# Patient Record
Sex: FEMALE | Race: WHITE | Employment: FULL TIME | URBAN - NONMETROPOLITAN AREA
[De-identification: names, ages, dates, MRNs, and addresses within clinical notes are randomized per-mention and may not be internally consistent; named-entity substitution may affect disease eponyms.]

---

## 2019-02-19 ENCOUNTER — OFFICE VISIT (OUTPATIENT)
Dept: FAMILY MEDICINE CLINIC | Age: 64
End: 2019-02-19

## 2019-02-19 VITALS
HEART RATE: 102 BPM | DIASTOLIC BLOOD PRESSURE: 84 MMHG | SYSTOLIC BLOOD PRESSURE: 134 MMHG | OXYGEN SATURATION: 97 % | BODY MASS INDEX: 46.63 KG/M2 | HEIGHT: 61 IN | TEMPERATURE: 98.2 F | WEIGHT: 247 LBS

## 2019-02-19 DIAGNOSIS — R21 RASH OF FACE: Primary | ICD-10-CM

## 2019-02-19 DIAGNOSIS — R21 ERYTHEMATOUS RASH: ICD-10-CM

## 2019-02-19 PROCEDURE — 99202 OFFICE O/P NEW SF 15 MIN: CPT | Performed by: NURSE PRACTITIONER

## 2019-02-19 RX ORDER — VITAMIN B COMPLEX
1 CAPSULE ORAL DAILY
COMMUNITY
End: 2019-03-11 | Stop reason: ALTCHOICE

## 2019-02-19 RX ORDER — METHYLPREDNISOLONE 4 MG
2 TABLET, DOSE PACK ORAL DAILY
COMMUNITY
End: 2019-03-11 | Stop reason: ALTCHOICE

## 2019-02-19 RX ORDER — BETAMETHASONE DIPROPIONATE 0.5 MG/G
CREAM TOPICAL
Qty: 45 G | Refills: 0 | Status: SHIPPED | OUTPATIENT
Start: 2019-02-19 | End: 2019-06-11

## 2019-02-19 RX ORDER — LORATADINE 10 MG/1
10 TABLET ORAL DAILY
COMMUNITY
End: 2019-03-11 | Stop reason: ALTCHOICE

## 2019-02-19 RX ORDER — CLOBETASOL PROPIONATE 0.05 G/100ML
SHAMPOO TOPICAL
Qty: 118 ML | Refills: 0 | Status: SHIPPED | OUTPATIENT
Start: 2019-02-19 | End: 2019-06-11 | Stop reason: ALTCHOICE

## 2019-02-19 RX ORDER — MILK THISTLE FRUIT EXTRACT 140 MG
CAPSULE ORAL
COMMUNITY
End: 2019-03-11 | Stop reason: ALTCHOICE

## 2019-02-19 ASSESSMENT — ENCOUNTER SYMPTOMS
COLOR CHANGE: 1
SHORTNESS OF BREATH: 0
CONSTIPATION: 0
DIARRHEA: 0
NAUSEA: 0
VOMITING: 0

## 2019-02-19 ASSESSMENT — PATIENT HEALTH QUESTIONNAIRE - PHQ9
SUM OF ALL RESPONSES TO PHQ9 QUESTIONS 1 & 2: 0
1. LITTLE INTEREST OR PLEASURE IN DOING THINGS: 0
SUM OF ALL RESPONSES TO PHQ QUESTIONS 1-9: 0
2. FEELING DOWN, DEPRESSED OR HOPELESS: 0
SUM OF ALL RESPONSES TO PHQ QUESTIONS 1-9: 0

## 2019-03-11 ENCOUNTER — OFFICE VISIT (OUTPATIENT)
Dept: FAMILY MEDICINE CLINIC | Age: 64
End: 2019-03-11

## 2019-03-11 VITALS
SYSTOLIC BLOOD PRESSURE: 140 MMHG | DIASTOLIC BLOOD PRESSURE: 66 MMHG | HEART RATE: 113 BPM | OXYGEN SATURATION: 94 % | BODY MASS INDEX: 45.5 KG/M2 | WEIGHT: 240.8 LBS

## 2019-03-11 DIAGNOSIS — L30.9 DERMATITIS: Primary | ICD-10-CM

## 2019-03-11 PROCEDURE — 99213 OFFICE O/P EST LOW 20 MIN: CPT | Performed by: FAMILY MEDICINE

## 2019-03-11 RX ORDER — PANTOTHENIC ACID (VIT B5) 500 MG
TABLET ORAL
COMMUNITY
End: 2019-03-11 | Stop reason: ALTCHOICE

## 2019-03-11 RX ORDER — HYDROXYZINE HYDROCHLORIDE 25 MG/1
25 TABLET, FILM COATED ORAL 2 TIMES DAILY
Qty: 30 TABLET | Refills: 0 | Status: SHIPPED | OUTPATIENT
Start: 2019-03-11 | End: 2019-04-10

## 2019-03-11 RX ORDER — PREDNISONE 10 MG/1
TABLET ORAL
Qty: 30 TABLET | Refills: 0 | Status: SHIPPED | OUTPATIENT
Start: 2019-03-11 | End: 2021-11-08 | Stop reason: CLARIF

## 2019-03-11 ASSESSMENT — ENCOUNTER SYMPTOMS
EYES NEGATIVE: 1
RESPIRATORY NEGATIVE: 1
GASTROINTESTINAL NEGATIVE: 1

## 2019-04-08 ENCOUNTER — TELEPHONE (OUTPATIENT)
Dept: FAMILY MEDICINE CLINIC | Age: 64
End: 2019-04-08

## 2019-04-08 DIAGNOSIS — Z12.11 ENCOUNTER FOR SCREENING COLONOSCOPY: Primary | ICD-10-CM

## 2019-04-08 NOTE — TELEPHONE ENCOUNTER
Patient says she saw dr Lul Perry for dermatitis. Dr Lul Perry recommended patient have a Mammogram and colonoscopy screening. Patient is asking if this something she should do soon or if she can wait until after mother's day.  Asking if she can do the fit test.

## 2019-05-20 ENCOUNTER — INITIAL CONSULT (OUTPATIENT)
Dept: GASTROENTEROLOGY | Age: 64
End: 2019-05-20
Payer: COMMERCIAL

## 2019-05-20 VITALS
WEIGHT: 228 LBS | HEIGHT: 61 IN | DIASTOLIC BLOOD PRESSURE: 78 MMHG | BODY MASS INDEX: 43.05 KG/M2 | SYSTOLIC BLOOD PRESSURE: 138 MMHG

## 2019-05-20 DIAGNOSIS — R79.89 ELEVATED LFTS: ICD-10-CM

## 2019-05-20 DIAGNOSIS — R13.19 ESOPHAGEAL DYSPHAGIA: ICD-10-CM

## 2019-05-20 DIAGNOSIS — Z12.11 SCREENING FOR COLON CANCER: Primary | ICD-10-CM

## 2019-05-20 PROCEDURE — 99204 OFFICE O/P NEW MOD 45 MIN: CPT | Performed by: INTERNAL MEDICINE

## 2019-05-20 RX ORDER — LORATADINE 10 MG/1
CAPSULE, LIQUID FILLED ORAL
COMMUNITY

## 2019-05-20 RX ORDER — POLYETHYLENE GLYCOL 3350 17 G/17G
238 POWDER ORAL DAILY
Qty: 255 G | Refills: 0 | Status: SHIPPED | OUTPATIENT
Start: 2019-05-20 | End: 2019-06-11

## 2019-05-20 NOTE — PATIENT INSTRUCTIONS
Zack Banner    [address] [facility] ,  [address] [facility]  Phone: [phone] [facility] [address], [address] [facility], [facility]  Phone: [phone]    Sedation  Three types of sedation are used for endoscopy and colonoscopy. The standard and most common is called conscious sedation. This is administered by the gastroenterologist and is part of the standard procedure. Common medications used for this are IV forms of a benzodiazepine (most commonly Versed) and a narcotic (most commonly fentanyl). Benadryl and nausea medicines may also be used. The effect of this is to make you comfortable. Most people will actually have amnesia with this and not recall the procedure. Some individuals will have other types of anesthesia provided by an anesthesiologist or nurse anesthetist.  The reason for this is a history of poor sedation, medication use that makes one more resistant to conscious sedation, a medical condition for which conscious sedation is contraindicated or other medical unstable conditions. This usually involves a separate fee from anesthesia. The most common of these is propofol (diprivan sedation) which is a deeper sedative the conscious sedation. In some instances, general anesthesia with intubation (breathing tube) is required. If you need to cancel or reschedule, please do so at least 2 weeks before the procedure, so that we can be considerate to other patients who are waiting to be scheduled.     If you cancel or reschedule less than 7 days before your procedure, you will be placed on a lower priority list.    ENDOSCOPY OVERVIEW  An upper endoscopy, often referred to as endoscopy, EGD, or uijxwqiu-jqnqsh-iqumcxkridec, is a procedure that allows a physician to directly examine the upper part of the gastrointestinal (GI) tract, which includes the esophagus (swallowing tube), the stomach, and the duodenum (the first section of the small intestine)  The physician who performs the procedures, known as an endoscopist, has special training in using an endoscope to examine the upper GI system, looking for inflammation (redness, irritation), bleeding, ulcers, or tumors. REASONS FOR UPPER ENDOSCOPY  The most common reasons for upper endoscopy include:  Unexplained discomfort in the upper abdomen   GERD or gastroesophageal reflux disease, (often called heartburn)   Persistent nausea and vomiting   Upper GI bleeding (vomiting blood or blood found in the stool that originated from the upper part of the gastrointestinal tract). Bleeding can be treated during the endoscopy. Difficulty swallowing; food/liquids getting stuck in the esophagus during swallowing. This may be caused by a narrowing (stricture) or tumor. The stricture may be dilated with special balloons or dilation tubes during the endoscopy. Abnormal or unclear findings on an upper GI x-ray, CT scan or MRI. Removal of a foreign body (a swallowed object). To check healing or progress on previously found polyps (growths), tumors, or ulcers. ENDOSCOPY PREPARATION  You will be given specific instructions regarding how to prepare for the examination before the procedure. These instructions are designed to maximize your safety during and after the examination and to minimize possible complications. It is important to read the instructions ahead of time and follow them carefully. Do not hesitate to call the physician's office or the endoscopy unit if there are questions. Nothing to eat after midnight the day before the test. You may be asked not to eat or drink anything for up to eight hours before the test. It is important for your stomach to be empty to allow the endoscopist to visualize the entire area and to decrease the possibility of food or fluid being vomited into the lungs while under sedation (called aspiration).   You may be asked to adjust the dose of your medications or to stop specific medications (such as aspirin-like drugs) temporarily before the examination. You should discuss your medications with your physician before your appointment for the endoscopy. You should arrange for a friend or family member to escort you home after the examination. Although you will be awake by the time you are discharged, the medications used for sedation cause temporary changes in the reflexes and judgment and interfere with your ability to drive or make decisions (similar to the effects of alcohol). WHAT TO EXPECT DURING ENDOSCOPY  Prior to the endoscopy, the staff will review your medical and surgical history, including current medications. A physician will explain the procedure and ask you to sign a consent. Before signing the consent, you should understand all the benefits and risks of the procedure, and should have all of your questions answered. An intravenous line (a needle inserted into a vein in the hand or arm) will be started to deliver medications. You will be given a combination of a sedative (to help you relax), and a narcotic (to prevent discomfort). Although most patients are sedated for the examination, many tolerate the procedure well without any medication. Your vital signs (blood pressure, heart rate, and blood oxygen level) will be monitored before, during, and after the examination. The monitoring is not painful. Oxygen is often given during the procedure through a small tube that sits under the nose and is fitted around the ears. For safety reasons, dentures should be removed before the procedure. THE ENDOSCOPY PROCEDURE  The procedure typically takes between 10 and 20 minutes to complete. The endoscopy is performed while you lie on your left side. Sometimes the physician will give a medication to numb the throat (either a gargle or a spray). A plastic mouth guard is placed between the teeth to prevent damage to the teeth and scope.   The endoscope (also called a gastroscope) is a flexible tube that is about the size of a finger. The scope has a lens and a light source that allows the endoscopist to look into the scope to see the inner lining of the upper gastrointestinal tract, or to view it on a TV monitor. Most people have no difficulty swallowing the flexible gastroscope as a result of the sedating medications. Many people sleep during the test; others are very relaxed and generally not aware of the examination. An alternative procedure called transnasal endoscopy may be available in some facilities. This involves passing a very thin scope (about the size of a drinking straw) through the nose. You are not sedated but a medication is applied to the nose to prevent discomfort. A full examination can be performed with this instrument. The endoscopist may take tissue samples called biopsies (not painful), or perform specific treatments (such as dilation, removal of polyps, treatment of bleeding), depending upon what is found during the examination. Air is introduced through the scope to open the esophagus, stomach, and intestine, allowing the scope to be passed through these structures and improving the endoscopist's ability to see all of the structures. You may experience a mild discomfort as air is pushed into the intestinal tract. This is not harmful and belching may relieve the sensation. The endoscope does not interfere with breathing. Taking slow, deep breaths during the procedure may help you to relax. ENDOSCOPY RECOVERY  After the endoscopy, you will be observed for one to two hours while the sedative medication wears off. The medicines cause most people to temporarily feel tired or have difficulty concentrating and you should not drive or return to work after the procedure. The most common discomfort after the examination is a feeling of bloating as a result of the air introduced during the examination. This usually resolves quickly.  Some patients also have a cleaned out so that the doctor can see any abnormal areas. To clean the colon, you will take a strong laxative and empty your bowels the night before your test.    Your doctor's office will provide specific instructions about how you should prepare for colonoscopy. Be sure to read these instructions ahead of time so you will be prepared for the prep. If you have questions, call the doctor's office in advance. You will need to avoid solid food for at least one day before the test. You should also drink plenty of fluids on the day before the test. You can drink clear liquids up to several hours before your procedure, including:        Water      Clear broth (beef, chicken, or vegetable)      Coffee or tea (without milk)      Ices      Gelatin (avoid red gelatin)    The day or night before the colonoscopy, you will take a laxative in two parts:        A pill that you take by mouth      A powder that is mixed with water    The most common laxative treatment is called Go-Lytely® or Half-Lytely®. You can add a flavoring (included), which, unfortunately, only partially hides the unpleasant taste. Most doctors do not recommend that you add other flavorings to the solution. Refrigerating the solution can make it easier to drink. Drinking this solution may be the most unpleasant part of the exam. You will begin to have watery diarrhea within a short time after drinking the solution. If you become nauseated or vomit while drinking the solution, call your doctor or nurse for instructions. Medicines - You can take most prescription and nonprescription medicines right up to the day of the colonoscopy. Your doctor should tell you what medicines to stop. You should also tell the doctor if you are allergic to any medicines. Some medicines increase the risk of heavy bleeding if you have a biopsy during the colonoscopy.  Ask your doctor how and when to stop these medicines, including warfarin/Coumadin® and clopidogrel/Plavix®. Transportation home - You will be given a sedative (a medicine to help you relax) during the colonoscopy, so you will need someone to take you home after your test. Although you will be awake by the time you go home, the sedative medicines cause changes in reflexes and judgment that can interfere with your ability to make decisions, similar to the effect of alcohol. WHAT TO EXPECT  Before the test, a doctor will review the test, including possible complications, and will ask you to sign a consent form. The nurse will start an IV line in your hand or arm. Your blood pressure and heart rate will be monitored during the test.    THE COLONOSCOPY PROCEDURE  You will be given fluid and medicines through an IV line. Many people sleep during the test, while others are very relaxed, comfortable, and generally not aware. The colonoscope is a flexible tube, approximately the size of the index finger. The scope pumps air into the colon to inflate it and allow the doctor to see the entire lining. You might feel bloating or gas cramps as the air opens the colon. Try not to be embarrassed about passing this gas, and let your doctor know if you are uncomfortable. During the procedure, the doctor might take a biopsy (small pieces of tissue) or remove polyps. Polyps are growths of tissue that can range in size from the tip of a pen to several inches. Most polyps are benign (not cancerous). However, some polyps can become cancerous if allowed to grow for a long time. Having a polyp removed does not hurt. RECOVERY FROM COLONOSCOPY  After the colonoscopy, you will be observed in a recovery area until the effects of the sedative medication wear off. The most common complaint after colonoscopy is a feeling of bloating and gas cramps. You may also feel groggy from the sedation medications. You should not return to work or drive that day.  Most people are able to eat normally after the test. Ask your WHERE TO GET MORE INFORMATION  Your healthcare provider is the best source of information for questions and concerns related to your medical problem. This article will be updated as needed every four months on our Web site ([email or web address]). The following organizations also provide reliable health information. [employer] (www.nlm.nih.gov/medlineplus/colonoscopy.html)  [address] for Gastrointestinal Endoscopy  (www.asge.org/PatientInfoIndex. aspx?rf=989)

## 2019-05-20 NOTE — LETTER
COLONOSCOPY PREP INSTRUCTIONS  MlRALAX SPLIT DOSE   [facility] PHYSICIAN ENDOSCOPY    Your colonoscopy is scheduled on: _6/3/19_   __Sarah/Ken_  Arrival Time: __10:00 am_   DO NOT EAT OR DRINK (INCLUDING WATER) AFTER: _6:00 am-after drinking the 2nd dose of prep  's Name_[facility] [phone]  [facility] [phone]    Keep these papers together; REVIEW ALL OF THEM AT LEAST 7 DAYS BEFORE THE PROCEDURE. Please complete all paperwork; including a current list of your medications, to avoid delays in the admission process. The following instructions must be followed in order to ensure your procedure has optimal outcomes. - KEEP YOUR APPOINTMENT. If for any reason, you are unable to keep your appointment, please notify us within 72 hours before your procedure. - You MUST have a responsible adult to drive you, who MUST remain at our facility the ENTIRE time. If not the procedure will be cancelled. You may go by taxi ONLY if you have a responsible adult with you. You may experience light headedness, dizziness etc., therefore you should have a responsible adult remain with you until the morning after your procedure. - Bring your insurance card and 's license. Call your insurance carrier to verify your benefits, and confirm that our facility is in your network, prior to the procedure date to ensure coverage. The facility name is listed as [facility] or [facility] [address]  and the tax ID# is [id number].  - Due to the safety and privacy of our patients, only one visitor is allowed in the recovery area after the procedure. The center will not be responsible for lost valuables so please leave them at home. - Try to avoid seeds (strawberries, reese, and rye) for one week prior to your procedure.   - If you have questions after beginning the prep, call between 8:30 am & have received instructions regarding if and when to discontinue the medication. If you have not, or do not clearly understand the instructions, please call the office for clarification (number listed above). 5. Drink plenty of fluid. 1 day prior to your procedure:  1. Do not eat any SOLID FOOD, beginning with breakfast drink clear liquids only, which includes: Chicken or Beef Broth, Coffee/tea (without milk or creamer) Gatorade/PowerAde (no red or purple), JeIl-O (no red or purple), All Soda (even dark cola), Sorbet/Popsicles (no red or purple), Water If you take Diabetic medications (insulin/oral medication)-reduce the amount by one half on the morning of prep. You may resume the meds once you begin eating again. You must drink 8oz of liquids every hour to avoid dehydration. 2. If you take Diabetic medications (insulin/oral medication) - reduce the amount by one half on morning of prep. You may resume the meds once you begin eating again. 3. Bowel Cleansing Prep  ** 3:00pm Take 4 dulcolax (bisacodyl) tablets with a full glass of water  ** 5:00pm Mix one bottle of Miralax (polyethlene glycol) (238/255gm) in one bottle of Gatorade (64oz). Shake until dissolved. Drink 8 oz every 15 minutes until you have finished half of the solution. MORNING OF PROCEDURE  1. __6:00 am__ (5 hours prior to the procedure) Drink the remaining portion of the solution 8 oz. every 15 minutes until completely finished, followed by 8 oz of any of the approved liquids. 2. Take your Blood pressure, Heart and Seizure medication the morning of the procedure with sips of water. 3. Bring inhalers with you. 4. Do not take your Diabetic medication the morning of procedure. 5. You must have a  stay with you during the entire procedure. Dear Latrice Whaley will receive a call from the [facility] pre-registration department prior to your GI procedure.  This will help streamline your check-in process on

## 2019-05-20 NOTE — LETTER
[address] ,  [address] [facility]  Phone: [phone] [facility] [address],  [address] [facility] Adele Castro  Phone: [phone]   YJL:[phone]    05/20/19    Patient:Argelia Esquivel  MR TSKTSS:[id number]  YOB: 1955  Date of Visit:5/20/19    Dear Dr. Alfredo Wheatley MD    Thank you for the request for consultation for Madhav Archibald to me for the evaluation of   Chief Complaint   Patient presents with   [address]     NP- dysphagia, needs screen colon   . Below are the relevant portions of my assessment and plan of care. FINAL DIAGNOSIS/Assessment   Diagnosis Orders   1. Screening for colon cancer  COLONOSCOPY W/ OR W/O BIOPSY    bisacodyl (DULCOLAX) 5 MG EC tablet    polyethylene glycol (MIRALAX) POWD powder   2. Esophageal dysphagia  EGD   3. Elevated LFTs  US Gallbladder Ruq       VISIT ORDERS/Plan  Orders Placed This Encounter   Procedures    COLONOSCOPY W/ OR W/O BIOPSY     Scheduling Instructions:      Please provide prep of choice instructions and prescription. General guidelines for holding blood thinners/anticoagulants around endoscopic procedure are but patients are encouraged to check with their prescribing physician. The patient may hold Plavix, Effient, Brilinta 5 days prior to the procedure unless:       A drug eluting stent has been placed within past 12 months. A nondrug eluting stent has been placed within past 1 month. Coumadin may be held 4 days prior to the procedure unless:        Mechanical mitral valve replacement (requires heparin bridge while Coumadin held and is managed by pharmacy)      Pradaxa, Xarelto, Eliquis may be held 2-3 days prior to procedure.   According to pharmacokinetics of the drug, package insert, cardiology practice patterns, and T1/2 of theses drugs (12 hrs), Eliquis and Xarelto prescribing physician. The patient may hold Plavix, Effient, Brilinta 5 days prior to the procedure unless:       A drug eluting stent has been placed within past 12 months. A nondrug eluting stent has been placed within past 1 month. Coumadin may be held 4 days prior to the procedure unless:        Mechanical mitral valve replacement (requires heparin bridge while Coumadin held and is managed by pharmacy)      Pradaxa, Xarelto, Eliquis may be held 2-3 days prior to procedure. According to pharmacokinetics of the drug, package insert, cardiology practice patterns, and T1/2 of theses drugs (12 hrs), Eliquis and Xarelto are held 48hrs prior to any procedure, including major surgical procedures w/o       increased bleeding.  That is usually the standard of care, as coagulation would/should be normalized at 48hrs. Every attempt should be made to maintain ASA 81mg per day throughout the vonnie-operative period in patients with diagnosis of ASHD. These recommendations may need to be modified by the provider/ based on risk /benefit analysis of the procedure and the patients history. If anticoagulation can not be held because recent cardiac stent, elective endoscopic procedures should be delayed until they have received the minimum duration of recommended antiplatlet therapy and it can safely be held. Again if unsure, patient should discuss with prescribing physician/service. If anticoagulation can not be stopped, endoscopic procedures can still be performed either diagnostically at a somewhat higher risk. Understand that any therapeutic procedure where anything beyond looking is performed, carries higher risks. For this reason without overt bleeding other testing       such as cologuard may be more appropriate.               High risk endoscopic procedures that require stopping antiplatelet and anticoagulation therapy include polypectomy, biliary or pancreatic sphincterotomy, pneumatic or bougie dilation, PEG placement, therapeutic balloon-assisted enteroscopy, EUS and FNA, tumor ablation by any technique,       cystogastrostomy,and treatment of varices. Order Specific Question:   Screening or Diagnostic? Answer:   Diagnostic       If you have questions, please do not hesitate to call me. I look forward to following Roma Nobles along with you.     Sincerely,        MANDY Verdin-Brittanyja 21 5/20/19 1:25 PM

## 2019-05-20 NOTE — PROGRESS NOTES
egd  [address] ,  [address] [facility], [facility]  Phone: [phone] COMPLAINT     Chief Complaint   Patient presents with   [address]     NP- dysphagia, needs screen colon       HPI     Thank you Jamey Niño MD for asking me to see Ritchie Liz in consultation. She is a Single [1] White [1] 58 y.o. Elaine Cr female seen independently who presents with the following GI complaints: Elaine Liz  Has been diagnosed with dermatomyositis with elevated lft's. CPK and transaminases are elevated. She was put on steroids and now has new solid food dysphagia without odynophagia. Has never had a colonoscopy. HPI elements: location, severity, timing, modifying factors, quality, duration, context and associated signs/symptoms. Last Encounter Reviewed:   Pertinent PMH, FH, SH is reviewed below. Last EGD: none  Last Colonoscopy: none    Review of available records reveals: Wt Readings from Last 50 Encounters:   19 228 lb (103.4 kg)   19 240 lb 12.8 oz (109.2 kg)   19 247 lb (112 kg)       No components found for: HGBA1C  BP Readings from Last 3 Encounters:   19 138/78   19 (!) 140/66   19 134/84     Health Maintenance   Topic Date Due    Hepatitis C screen  1955    HIV screen  1969    DTaP/Tdap/Td vaccine (1 - Tdap) 1973    Cervical cancer screen  1975    Lipid screen  1994    Diabetes screen  1994    Breast cancer screen  2004    Shingles Vaccine (1 of 2) 2004    Colon cancer screen colonoscopy  2004    Flu vaccine (Season Ended) 2019    Pneumococcal 0-64 years Vaccine  Aged Out       No components found for: [address]   No past medical history on file.   FAMILY HISTORY     Family History   Problem Relation Age of Onset    Other Mother         dieds in [de-identified] cancer;    Other Father          at age 80    Other Sister         breast cancer;    Other Brother         ??    Other Brother         one  at 32 of psych ;one at age 68-parkinsons     SOCIAL HISTORY     Social History     Socioeconomic History    Marital status: Single     Spouse name: Not on file    Number of children: Not on file    Years of education: Not on file    Highest education level: Not on file   Occupational History    Not on file   Social Needs    Financial resource strain: Not on file    Food insecurity:     Worry: Not on file     Inability: Not on file    Transportation needs:     Medical: Not on file     Non-medical: Not on file   Tobacco Use    Smoking status: Never Smoker    Smokeless tobacco: Never Used   Substance and Sexual Activity    Alcohol use: No    Drug use: No    Sexual activity: Not Currently   Lifestyle    Physical activity:     Days per week: Not on file     Minutes per session: Not on file    Stress: Not on file   Relationships    Social connections:     Talks on phone: Not on file     Gets together: Not on file     Attends Tenriism service: Not on file     Active member of club or organization: Not on file     Attends meetings of clubs or organizations: Not on file     Relationship status: Not on file    Intimate partner violence:     Fear of current or ex partner: Not on file     Emotionally abused: Not on file     Physically abused: Not on file     Forced sexual activity: Not on file   Other Topics Concern    Not on file   Social History Narrative    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;worked with flowers;     SURGICAL HISTORY     Past Surgical History:   Procedure Laterality Date    KNEE SURGERY Left     lateral release    SPINE SURGERY      lumbar 4 to 5     CURRENT MEDICATIONS   (This list may include medications prescribed during this encounter as epic can not insert only the list prior to this encounter.)  Current Outpatient Rx   Medication Sig Dispense Refill    loratadine (CLARITIN) 10 MG capsule Take by mouth      bisacodyl (DULCOLAX) 5 MG EC tablet Take 1 tablet by mouth daily as needed for Constipation 4 tablet 0    polyethylene glycol (MIRALAX) POWD powder Take 238 g by mouth daily Take as directed for colonoscopy 255 g 0    predniSONE (DELTASONE) 10 MG tablet 2bid for 5 days;then 2 qd for 5 days. 30 tablet 0    Boswellia Sara (BOSWELLIA PO) Take 1 capsule by mouth daily      Clobetasol Propionate 0.05 % SHAM Use 1-2 times daily while bathing. 118 mL 0    betamethasone dipropionate (DIPROLENE) 0.05 % cream Apply topically 2 times daily. 45 g 0     ALLERGIES     Allergies   Allergen Reactions    Asa [Aspirin] Rash    Penicillins Rash     IMMUNIZATIONS     There is no immunization history on file for this patient. REVIEW OF SYSTEMS   See HPI for further details and pertinent postiives. Negative for the following:  Constitutional: Negative for weight change. Negative for appetite change and fatigue. HENT: Negative for nosebleeds, sore throat, mouth sores, and voice change. Respiratory: Negative for cough, choking and chest tightness. Cardiovascular: Negative for chest pain   Gastrointestinal: See HPI  Musculoskeletal: Negative for arthralgias. Skin: Negative for pallor. Neurological: Negative for weakness and light-headedness. Hematological: Negative for adenopathy. Does not bruise/bleed easily. Psychiatric/Behavioral: Negative for suicidal ideas. PHYSICAL EXAM   VITAL SIGNS: /78 (Site: Right Upper Arm)   Ht 5' 1\" (1.549 m)   Wt 228 lb (103.4 kg)   BMI 43.08 kg/m²   Wt Readings from Last 3 Encounters:   05/20/19 228 lb (103.4 kg)   03/11/19 240 lb 12.8 oz (109.2 kg)   02/19/19 247 lb (112 kg)     Constitutional: Well developed, Well nourished, No acute distress, Non-toxic appearance. HENT: Normocephalic, Atraumatic, Bilateral external ears normal, Oropharynx moist, No oral exudates, Nose normal.   Eyes: Conjunctiva normal, No discharge.    Neck: Normal range of motion, No tenderness, Supple, No stridor. Lymphatic: No cervical, subclavian, or axillary lymphadenopathy. Cardiovascular: Normal heart rate, Normal rhythm, No murmurs, No rubs, No gallops. Thorax & Lungs: Normal breath sounds, No respiratory distress, No wheezing, No chest tenderness. No gynecomastia. Abdomen: scars consistent with stated surgeries, no hernias, no HSM, soft NTND   Rectal:  Deferred. Skin: Warm, Dry, No erythema, No rash. No bruising. No spider hemangiomas. Back: No tenderness, No CVA tenderness. Lower Extremities: Intact distal pulses, No edema, No tenderness, No cyanosis, No clubbing. Neurologic: Alert & oriented x 3, Normal motor function, Normal sensory function, No focal deficits noted. No asterixis. RADIOLOGY/PROCEDURES       FINAL IMPRESSION     Orders Placed This Encounter   Procedures    COLONOSCOPY W/ OR W/O BIOPSY     Scheduling Instructions:      Please provide prep of choice instructions and prescription. General guidelines for holding blood thinners/anticoagulants around endoscopic procedure are but patients are encouraged to check with their prescribing physician. The patient may hold Plavix, Effient, Brilinta 5 days prior to the procedure unless:       A drug eluting stent has been placed within past 12 months. A nondrug eluting stent has been placed within past 1 month. Coumadin may be held 4 days prior to the procedure unless:        Mechanical mitral valve replacement (requires heparin bridge while Coumadin held and is managed by pharmacy)      Pradaxa, Xarelto, Eliquis may be held 2-3 days prior to procedure.   According to pharmacokinetics of the drug, package insert, cardiology practice patterns, and T1/2 of theses drugs (12 hrs), Eliquis and Xarelto are held 48hrs prior to any procedure, including major surgical procedures w/o       increased bleeding.  That is usually the standard of care, as coagulation would/should be normalized at 48hrs. Every attempt should be made to maintain ASA 81mg per day throughout the vonnie-operative period in patients with diagnosis of ASHD. These recommendations may need to be modified by the provider/ based on risk /benefit analysis of the procedure and the patients history. If anticoagulation can not be held because recent cardiac stent, elective endoscopic procedures should be delayed until they have received the minimum duration of recommended antiplatlet therapy and it can safely be held. Again if unsure, patient should discuss with prescribing physician/service. If anticoagulation can not be stopped, endoscopic procedures can still be performed either diagnostically at a somewhat higher risk. Understand that any therapeutic procedure where anything beyond looking is performed, carries higher risks. For this reason without overt bleeding other testing       such as cologuard may be more appropriate. High risk endoscopic procedures that require stopping antiplatelet and anticoagulation therapy include polypectomy, biliary or pancreatic sphincterotomy, pneumatic or bougie dilation, PEG placement, therapeutic balloon-assisted enteroscopy, EUS and FNA, tumor ablation by any technique,       cystogastrostomy,and treatment of varices. Order Specific Question:   Screening or Diagnostic? Answer:   Diagnostic    US Gallbladder Ruq     Standing Status:   Future     Standing Expiration Date:   5/20/2020    EGD     Scheduling Instructions:      Please provide prep of choice instructions and prescription. General guidelines for holding blood thinners/anticoagulants around endoscopic procedure are but patients are encouraged to check with their prescribing physician. The patient may hold Plavix, Effient, Brilinta 5 days prior to the procedure unless:       A drug eluting stent has been placed within past 12 months.       A nondrug eluting stent has been placed within past 1 month. Coumadin may be held 4 days prior to the procedure unless:        Mechanical mitral valve replacement (requires heparin bridge while Coumadin held and is managed by pharmacy)      Pradaxa, Xarelto, Eliquis may be held 2-3 days prior to procedure. According to pharmacokinetics of the drug, package insert, cardiology practice patterns, and T1/2 of theses drugs (12 hrs), Eliquis and Xarelto are held 48hrs prior to any procedure, including major surgical procedures w/o       increased bleeding.  That is usually the standard of care, as coagulation would/should be normalized at 48hrs. Every attempt should be made to maintain ASA 81mg per day throughout the vonnie-operative period in patients with diagnosis of ASHD. These recommendations may need to be modified by the provider/ based on risk /benefit analysis of the procedure and the patients history. If anticoagulation can not be held because recent cardiac stent, elective endoscopic procedures should be delayed until they have received the minimum duration of recommended antiplatlet therapy and it can safely be held. Again if unsure, patient should discuss with prescribing physician/service. If anticoagulation can not be stopped, endoscopic procedures can still be performed either diagnostically at a somewhat higher risk. Understand that any therapeutic procedure where anything beyond looking is performed, carries higher risks. For this reason without overt bleeding other testing       such as cologuard may be more appropriate.               High risk endoscopic procedures that require stopping antiplatelet and anticoagulation therapy include polypectomy, biliary or pancreatic sphincterotomy, pneumatic or bougie dilation, PEG placement, therapeutic balloon-assisted enteroscopy, EUS and FNA, tumor ablation by any technique,       cystogastrostomy,and treatment of varices. Order Specific Question:   Screening or Diagnostic? Answer:   Jackelyn Martinez was seen today for establish care. Diagnoses and all orders for this visit:    Screening for colon cancer  -     COLONOSCOPY W/ OR W/O BIOPSY  -     bisacodyl (DULCOLAX) 5 MG EC tablet; Take 1 tablet by mouth daily as needed for Constipation  -     polyethylene glycol (MIRALAX) POWD powder; Take 238 g by mouth daily Take as directed for colonoscopy    Esophageal dysphagia  -     EGD    Elevated LFTs  -     US Gallbladder Ruq; Future      ORDERED FUTURE/PENDING TESTS       FOLLOWUP   Return for EGD & Colonoscopy.           Lila 40 5/20/19 1:14 PM    CC:  Betsy Hernandez MD

## 2019-05-28 ENCOUNTER — HOSPITAL ENCOUNTER (OUTPATIENT)
Dept: ULTRASOUND IMAGING | Age: 64
Discharge: HOME | End: 2019-05-28
Payer: COMMERCIAL

## 2019-05-28 DIAGNOSIS — R79.89 ELEVATED LFTS: ICD-10-CM

## 2019-05-28 PROCEDURE — 76705 ECHO EXAM OF ABDOMEN: CPT

## 2019-05-29 NOTE — RESULT ENCOUNTER NOTE
Please call patient with results. Large kidney cyst. If symptomatic would need to see urology but she did not indicate any symptoms of this at her visit. Rec repeat lft's every 3 months.

## 2019-05-31 ENCOUNTER — TELEPHONE (OUTPATIENT)
Dept: GASTROENTEROLOGY | Age: 64
End: 2019-05-31

## 2019-06-01 ENCOUNTER — TELEPHONE (OUTPATIENT)
Dept: GASTROENTEROLOGY | Age: 64
End: 2019-06-01

## 2019-06-11 ENCOUNTER — OFFICE VISIT (OUTPATIENT)
Dept: FAMILY MEDICINE CLINIC | Age: 64
End: 2019-06-11
Payer: COMMERCIAL

## 2019-06-11 ENCOUNTER — TELEPHONE (OUTPATIENT)
Dept: FAMILY MEDICINE CLINIC | Age: 64
End: 2019-06-11

## 2019-06-11 VITALS
BODY MASS INDEX: 39.49 KG/M2 | OXYGEN SATURATION: 99 % | WEIGHT: 209 LBS | SYSTOLIC BLOOD PRESSURE: 140 MMHG | HEART RATE: 114 BPM | DIASTOLIC BLOOD PRESSURE: 73 MMHG

## 2019-06-11 DIAGNOSIS — J69.0 ASPIRATION PNEUMONIA OF LEFT UPPER LOBE, UNSPECIFIED ASPIRATION PNEUMONIA TYPE (HCC): Primary | ICD-10-CM

## 2019-06-11 PROCEDURE — 99214 OFFICE O/P EST MOD 30 MIN: CPT | Performed by: FAMILY MEDICINE

## 2019-06-11 NOTE — PROGRESS NOTES
Post-Discharge Transitional Care Management Services      Magy Palmer   YOB: 1955    Date of Visit:  6/11/2019  30 Day Post-Discharge Date: ***    Allergies   Allergen Reactions    Asa [Aspirin] Rash    Penicillins Rash     Outpatient Medications Marked as Taking for the 6/11/19 encounter (Office Visit) with Rian Robles MD   Medication Sig Dispense Refill    loratadine (CLARITIN) 10 MG capsule Take by mouth      predniSONE (DELTASONE) 10 MG tablet 2bid for 5 days;then 2 qd for 5 days. 30 tablet 0         Vitals:    06/11/19 1449   BP: (!) 140/73   Pulse: 114   SpO2: 99%   Weight: 209 lb (94.8 kg)     Body mass index is 39.49 kg/m². Wt Readings from Last 3 Encounters:   06/11/19 209 lb (94.8 kg)   05/20/19 228 lb (103.4 kg)   03/11/19 240 lb 12.8 oz (109.2 kg)     BP Readings from Last 3 Encounters:   06/11/19 (!) 140/73   05/20/19 138/78   03/11/19 (!) 140/66        Patient was [facility] to [facility]:19898} from *** to *** for ***. Inpatient course: Discharge summary reviewed- see chart. Current status: ***    Review of Systems:  {ROS Comprehensive:96828::\"A comprehensive review of systems was negative except for what was noted in the HPI. \"}    Physical Exam:  {GENERAL PHYSICAL EXAM:19990}    Initial post-discharge communication occurred between {TCMPN1:88341} and {TCMPN2:41584} on ***- see documentation in chart: {TCMPN3:55412}. Assessment/Plan:  There are no diagnoses linked to this encounter.       Diagnostic test results reviewed: {TCMPN5:44331}    Patient risk of morbidity and mortality: {Desc; low/moderate/high:146940}    Medical Decision Making: {TCMPN4:33989}

## 2019-06-16 ASSESSMENT — ENCOUNTER SYMPTOMS: SHORTNESS OF BREATH: 1

## 2019-06-28 ENCOUNTER — TELEPHONE (OUTPATIENT)
Dept: GASTROENTEROLOGY | Age: 64
End: 2019-06-28

## 2019-07-09 ENCOUNTER — TELEPHONE (OUTPATIENT)
Dept: FAMILY MEDICINE CLINIC | Age: 64
End: 2019-07-09

## 2019-07-15 ENCOUNTER — HOSPITAL ENCOUNTER (OUTPATIENT)
Age: 64
Setting detail: OUTPATIENT SURGERY
Discharge: HOME | End: 2019-07-15
Attending: INTERNAL MEDICINE | Admitting: INTERNAL MEDICINE
Payer: COMMERCIAL

## 2019-07-15 ENCOUNTER — TELEPHONE (OUTPATIENT)
Dept: GASTROENTEROLOGY | Age: 64
End: 2019-07-15

## 2019-07-15 VITALS
RESPIRATION RATE: 16 BRPM | TEMPERATURE: 97.1 F | HEIGHT: 61 IN | DIASTOLIC BLOOD PRESSURE: 79 MMHG | WEIGHT: 205 LBS | OXYGEN SATURATION: 95 % | SYSTOLIC BLOOD PRESSURE: 135 MMHG | BODY MASS INDEX: 38.71 KG/M2 | HEART RATE: 91 BPM

## 2019-07-15 PROCEDURE — 7100000010 HC PHASE II RECOVERY - FIRST 15 MIN: Performed by: INTERNAL MEDICINE

## 2019-07-15 PROCEDURE — 99153 MOD SED SAME PHYS/QHP EA: CPT | Performed by: INTERNAL MEDICINE

## 2019-07-15 PROCEDURE — 3609027000 HC COLONOSCOPY: Performed by: INTERNAL MEDICINE

## 2019-07-15 PROCEDURE — 7100000011 HC PHASE II RECOVERY - ADDTL 15 MIN: Performed by: INTERNAL MEDICINE

## 2019-07-15 PROCEDURE — 45378 DIAGNOSTIC COLONOSCOPY: CPT | Performed by: INTERNAL MEDICINE

## 2019-07-15 PROCEDURE — 99152 MOD SED SAME PHYS/QHP 5/>YRS: CPT | Performed by: INTERNAL MEDICINE

## 2019-07-15 PROCEDURE — 43235 EGD DIAGNOSTIC BRUSH WASH: CPT | Performed by: INTERNAL MEDICINE

## 2019-07-15 PROCEDURE — 2709999900 HC NON-CHARGEABLE SUPPLY: Performed by: INTERNAL MEDICINE

## 2019-07-15 PROCEDURE — 43450 DILATE ESOPHAGUS 1/MULT PASS: CPT | Performed by: INTERNAL MEDICINE

## 2019-07-15 PROCEDURE — 6360000002 HC RX W HCPCS: Performed by: INTERNAL MEDICINE

## 2019-07-15 PROCEDURE — 3609015300 HC ESOPHAGEAL DILATION MALONEY: Performed by: INTERNAL MEDICINE

## 2019-07-15 PROCEDURE — 3609017100 HC EGD: Performed by: INTERNAL MEDICINE

## 2019-07-15 RX ORDER — OMEPRAZOLE 20 MG/1
20 CAPSULE, DELAYED RELEASE ORAL DAILY
Qty: 30 CAPSULE | Refills: 11 | Status: SHIPPED | OUTPATIENT
Start: 2019-07-15 | End: 2021-11-08 | Stop reason: CLARIF

## 2019-07-15 RX ORDER — MIDAZOLAM HYDROCHLORIDE 5 MG/ML
INJECTION INTRAMUSCULAR; INTRAVENOUS PRN
Status: DISCONTINUED | OUTPATIENT
Start: 2019-07-15 | End: 2019-07-15 | Stop reason: ALTCHOICE

## 2019-07-15 RX ORDER — FENTANYL CITRATE 50 UG/ML
INJECTION, SOLUTION INTRAMUSCULAR; INTRAVENOUS PRN
Status: DISCONTINUED | OUTPATIENT
Start: 2019-07-15 | End: 2019-07-15 | Stop reason: ALTCHOICE

## 2019-07-15 RX ORDER — SODIUM CHLORIDE, SODIUM LACTATE, POTASSIUM CHLORIDE, CALCIUM CHLORIDE 600; 310; 30; 20 MG/100ML; MG/100ML; MG/100ML; MG/100ML
INJECTION, SOLUTION INTRAVENOUS ONCE
Status: DISCONTINUED | OUTPATIENT
Start: 2019-07-15 | End: 2019-07-15 | Stop reason: HOSPADM

## 2019-07-15 ASSESSMENT — PAIN DESCRIPTION - DESCRIPTORS: DESCRIPTORS: ACHING

## 2019-07-15 ASSESSMENT — PAIN - FUNCTIONAL ASSESSMENT: PAIN_FUNCTIONAL_ASSESSMENT: 0-10

## 2019-07-15 NOTE — TELEPHONE ENCOUNTER
----- Message from Elma Carrera MD sent at 7/15/2019 11:18 AM EDT -----  Colon recall 10 years, screening.

## 2019-07-15 NOTE — H&P
[address] ,  [address] [facility]  Phone: [phone]     CHIEF COMPLAINT           Chief Complaint   Patient presents with    Establish Care       NP- dysphagia, needs screen colon         HPI      Thank you Byron Chang MD for asking me to see Dar Sutherland in consultation. She is a Single [1] White [1] 58 y.o. Jean Bills female seen independently who presents with the following GI complaints: Jean Sutherland  Has been diagnosed with dermatomyositis with elevated lft's. CPK and transaminases are elevated. She was put on steroids and now has new solid food dysphagia without odynophagia. Has never had a colonoscopy.     HPI elements: location, severity, timing, modifying factors, quality, duration, context and associated signs/symptoms.     Last Encounter Reviewed:   Pertinent PMH, FH, SH is reviewed below. Last EGD: none  Last Colonoscopy: none     Review of available records reveals: Wt Readings from Last 50 Encounters:   05/20/19 228 lb (103.4 kg)   03/11/19 240 lb 12.8 oz (109.2 kg)   02/19/19 247 lb (112 kg)         No components found for: HGBA1C      BP Readings from Last 3 Encounters:   05/20/19 138/78   03/11/19 (!) 140/66   02/19/19 134/84           Health Maintenance   Topic Date Due    Hepatitis C screen  1955    HIV screen  09/03/1969    DTaP/Tdap/Td vaccine (1 - Tdap) 09/03/1973    Cervical cancer screen  09/03/1975    Lipid screen  09/03/1994    Diabetes screen  09/03/1994    Breast cancer screen  09/03/2004    Shingles Vaccine (1 of 2) 09/03/2004    Colon cancer screen colonoscopy  09/03/2004    Flu vaccine (Season Ended) 09/01/2019    Pneumococcal 0-64 years Vaccine  Aged Out         No components found for: [facility]      PAST MEDICAL HISTORY   Past Medical History   No past medical history on file.      FAMILY HISTORY      Family History         Family History   Problem Relation Age of Onset    Other

## 2019-07-15 NOTE — OP NOTE
hemorrhoids, fissures or skin tags. The colonoscope was inserted into the rectum and advanced under direct vision to the cecum, which was identified by the ileocecal valve and appendiceal orifice. The right colon was examined twice as this increases polyp detection especially if other right colon polyps, older age, male, or hinojosa syndrome. When segments could not be distended with CO2 or air, it was filled/distended with water. The quality of the colonic preparation was good. A careful inspection was made as the colonoscope was withdrawn, including a retroflexed view of the rectum; findings and interventions are described below. Appropriate photodocumentation Was Obtained. If photos taken, they were ordered to be scanned into the medical record. Findings:   -normal stomach, and duodenum  -Small (< 3 cm) sliding hiatal hernia  -esophagitis, LA Classification B (multiple erosions/ulcers)  -Schatski's ring at the GE junction - The esophagus was dilated with a 54F Richards with mild resistance and no heme.  -normal colonic mucosa throughout  - PREP: miralax  - Overall difficulty: mild in degree  - Abdominal pressure: yes - sigmoid  - Change in position: no  - Anesthesia issues: no  - Medivator use: no    Specimens: Was Obtained    Complications:   None; patient tolerated the procedure well. Disposition:   PACU - hemodynamically stable. Withdrawal Time:  8 minutes    Impression:   -See post-procedure diagnoses. Recommendations:  -Acid suppression with a proton pump inhibitor.  -Monitor response to esophageal dilation. Follow up recommended if dilation did not help or it recurs. -Screening colon 10 years.         Lila 40 7/15/19 11:04 AM

## 2019-09-23 ENCOUNTER — OFFICE VISIT (OUTPATIENT)
Dept: FAMILY MEDICINE CLINIC | Age: 64
End: 2019-09-23
Payer: MEDICARE

## 2019-09-23 VITALS
HEART RATE: 103 BPM | DIASTOLIC BLOOD PRESSURE: 62 MMHG | BODY MASS INDEX: 43.16 KG/M2 | OXYGEN SATURATION: 96 % | WEIGHT: 228.4 LBS | SYSTOLIC BLOOD PRESSURE: 138 MMHG

## 2019-09-23 DIAGNOSIS — E66.01 MORBID OBESITY WITH BMI OF 40.0-44.9, ADULT (HCC): ICD-10-CM

## 2019-09-23 DIAGNOSIS — M17.12 PRIMARY OSTEOARTHRITIS OF LEFT KNEE: Primary | ICD-10-CM

## 2019-09-23 PROCEDURE — G8400 PT W/DXA NO RESULTS DOC: HCPCS | Performed by: FAMILY MEDICINE

## 2019-09-23 PROCEDURE — 1036F TOBACCO NON-USER: CPT | Performed by: FAMILY MEDICINE

## 2019-09-23 PROCEDURE — G8427 DOCREV CUR MEDS BY ELIG CLIN: HCPCS | Performed by: FAMILY MEDICINE

## 2019-09-23 PROCEDURE — 1090F PRES/ABSN URINE INCON ASSESS: CPT | Performed by: FAMILY MEDICINE

## 2019-09-23 PROCEDURE — G8417 CALC BMI ABV UP PARAM F/U: HCPCS | Performed by: FAMILY MEDICINE

## 2019-09-23 PROCEDURE — 1123F ACP DISCUSS/DSCN MKR DOCD: CPT | Performed by: FAMILY MEDICINE

## 2019-09-23 PROCEDURE — 99213 OFFICE O/P EST LOW 20 MIN: CPT | Performed by: FAMILY MEDICINE

## 2019-09-23 PROCEDURE — 3017F COLORECTAL CA SCREEN DOC REV: CPT | Performed by: FAMILY MEDICINE

## 2019-09-23 PROCEDURE — 4040F PNEUMOC VAC/ADMIN/RCVD: CPT | Performed by: FAMILY MEDICINE

## 2019-09-23 RX ORDER — MELATONIN: COMMUNITY

## 2019-09-23 RX ORDER — HYDROCODONE BITARTRATE AND ACETAMINOPHEN 5; 325 MG/1; MG/1
1 TABLET ORAL EVERY 8 HOURS PRN
Qty: 20 TABLET | Refills: 0 | Status: SHIPPED | OUTPATIENT
Start: 2019-09-23 | End: 2019-09-30

## 2019-09-23 ASSESSMENT — ENCOUNTER SYMPTOMS: BACK PAIN: 1

## 2019-09-23 NOTE — PROGRESS NOTES
complain of pain when she reaches to the right side. The pain is in her lower lumbar area and slightly to the left of midline. Left knee exam: Small effusion, no increased warmth. Range of motion somewhat limited flexion   Nursing note and vitals reviewed. /62 (Site: Right Upper Arm, Position: Sitting, Cuff Size: Large Adult)   Pulse 103   Wt 228 lb 6.4 oz (103.6 kg)   SpO2 96%   BMI 43.16 kg/m²     Assessment:      Francisca Nuñez was seen today for back pain and knee pain. Diagnoses and all orders for this visit:    Primary osteoarthritis of left knee  -     HYDROcodone-acetaminophen (NORCO) 5-325 MG per tablet; Take 1 tablet by mouth every 8 hours as needed for Pain for up to 7 days. Morbid obesity with BMI of 40.0-44.9, adult ([facility] [address]    Morbid obesity with BMI of 40.0-44.9, adult [facility])           Plan:        Patient Instructions   Call if problems    Please call if increased radiating pain in legs or bowel/bladder dysfunction. The prednisone may lead to a collapsed lumbar vertebra, but this examiner does not believe that is the case at this time.   Persistent symptoms-please let us know    You were offered a steroid injection into the left knee but deferred           Osman Zayas

## 2021-11-08 ENCOUNTER — OFFICE VISIT (OUTPATIENT)
Dept: FAMILY MEDICINE CLINIC | Age: 66
End: 2021-11-08
Payer: MEDICARE

## 2021-11-08 VITALS
DIASTOLIC BLOOD PRESSURE: 80 MMHG | HEART RATE: 86 BPM | BODY MASS INDEX: 47.51 KG/M2 | WEIGHT: 242 LBS | SYSTOLIC BLOOD PRESSURE: 142 MMHG | HEIGHT: 60 IN | OXYGEN SATURATION: 98 % | TEMPERATURE: 97.6 F

## 2021-11-08 DIAGNOSIS — Z13.220 LIPID SCREENING: ICD-10-CM

## 2021-11-08 DIAGNOSIS — M33.90 DERMATOMYOSITIS (HCC): Primary | ICD-10-CM

## 2021-11-08 DIAGNOSIS — E66.01 CLASS 3 SEVERE OBESITY DUE TO EXCESS CALORIES WITH SERIOUS COMORBIDITY AND BODY MASS INDEX (BMI) OF 45.0 TO 49.9 IN ADULT (HCC): ICD-10-CM

## 2021-11-08 PROCEDURE — 1123F ACP DISCUSS/DSCN MKR DOCD: CPT | Performed by: NURSE PRACTITIONER

## 2021-11-08 PROCEDURE — G8427 DOCREV CUR MEDS BY ELIG CLIN: HCPCS | Performed by: NURSE PRACTITIONER

## 2021-11-08 PROCEDURE — G8417 CALC BMI ABV UP PARAM F/U: HCPCS | Performed by: NURSE PRACTITIONER

## 2021-11-08 PROCEDURE — 1090F PRES/ABSN URINE INCON ASSESS: CPT | Performed by: NURSE PRACTITIONER

## 2021-11-08 PROCEDURE — 3017F COLORECTAL CA SCREEN DOC REV: CPT | Performed by: NURSE PRACTITIONER

## 2021-11-08 PROCEDURE — G8400 PT W/DXA NO RESULTS DOC: HCPCS | Performed by: NURSE PRACTITIONER

## 2021-11-08 PROCEDURE — 99203 OFFICE O/P NEW LOW 30 MIN: CPT | Performed by: NURSE PRACTITIONER

## 2021-11-08 PROCEDURE — 1036F TOBACCO NON-USER: CPT | Performed by: NURSE PRACTITIONER

## 2021-11-08 PROCEDURE — G8484 FLU IMMUNIZE NO ADMIN: HCPCS | Performed by: NURSE PRACTITIONER

## 2021-11-08 PROCEDURE — 4040F PNEUMOC VAC/ADMIN/RCVD: CPT | Performed by: NURSE PRACTITIONER

## 2021-11-08 RX ORDER — ASCORBIC ACID 1000 MG
1 TABLET, EXTENDED RELEASE ORAL DAILY
COMMUNITY

## 2021-11-08 RX ORDER — THIAMINE MONONITRATE (VIT B1) 100 MG
100 TABLET ORAL DAILY
COMMUNITY

## 2021-11-08 RX ORDER — PREDNISONE 1 MG/1
5 TABLET ORAL DAILY
COMMUNITY

## 2021-11-08 RX ORDER — CHLORAL HYDRATE 500 MG
1 CAPSULE ORAL DAILY
COMMUNITY

## 2021-11-08 RX ORDER — PETROLATUM,WHITE/LANOLIN
OINTMENT (GRAM) TOPICAL
COMMUNITY

## 2021-11-08 ASSESSMENT — PATIENT HEALTH QUESTIONNAIRE - PHQ9
1. LITTLE INTEREST OR PLEASURE IN DOING THINGS: 0
SUM OF ALL RESPONSES TO PHQ QUESTIONS 1-9: 0
SUM OF ALL RESPONSES TO PHQ9 QUESTIONS 1 & 2: 0
2. FEELING DOWN, DEPRESSED OR HOPELESS: 0

## 2021-11-08 ASSESSMENT — ENCOUNTER SYMPTOMS
RHINORRHEA: 0
CONSTIPATION: 0
FACIAL SWELLING: 0
EYE REDNESS: 0
EYE PAIN: 0
NAUSEA: 0
BLOOD IN STOOL: 0
WHEEZING: 0
EYE DISCHARGE: 0
VOICE CHANGE: 0
APNEA: 0
CHOKING: 0
DIARRHEA: 0
COLOR CHANGE: 0
TROUBLE SWALLOWING: 0
CHEST TIGHTNESS: 0
ABDOMINAL DISTENTION: 0
SORE THROAT: 0
SHORTNESS OF BREATH: 0
PHOTOPHOBIA: 0
SINUS PAIN: 0
RECTAL PAIN: 0
ABDOMINAL PAIN: 0
STRIDOR: 0
VOMITING: 0
COUGH: 0
EYE ITCHING: 0
ANAL BLEEDING: 0
SINUS PRESSURE: 0

## 2021-11-08 NOTE — PROGRESS NOTES
11/8/2021    Chief Complaint   Patient presents with    Establish Care    Dermatitis     dermatomyotitis she sees Dr Kera Salgado for this        Chandni Sousa is a 78 y.o. female, presents today for:      ASSESSMENT/PLAN:    1. Dermatomyositis ([address]  Currently in flare, continue Prednisone taper  Continue care with Dr. Kera Salgado, appreciate assistance  Continue Prednisone/ Methotrexate for maintenance  Encouraged vaccinations- COVID, Influenza, PNA. Pt will wait until after finishing Prednisone taper before received vaccinations. Understands to hold MTX 1 week after received vaccinations  Discussed trial of Betadine or Vinegar to help reduce bilateral paronychia while working. 2. Class 3 severe obesity due to excess calories with serious comorbidity and body mass index (BMI) of 45.0 to 49.9 in [facility])  Encouraged exercise, decreased portions    3. Lipid screening  Labs ordered today. - Lipid, Fasting; Future      Return in about 9 months (around 8/8/2022). Presenting today to establish with care. Prior provider retired. Dermatomysitis: Currently following with Dr. Kera Salgado at [facility]. Currently on Methotrexate and Prednisone. Recent flare- Prednisone recently increased. Continues to have red hand blaterally for the past 6 months resolving with Prednisone. Has gained weight- 14 lbs- since 2019. Is having increased hunger with Prednisone. Just \"wants to get back to normal\" which she knows is likely unrealistic. Currently working as .  All edges of fingernails are very sensitivie- occurred since dx dermatomysitis. Washing daily with antibacterial soap. No improvement in pain with Aspercreme/ Lidocaine. Scared to get COVID vaccines regarding 2nd vaccine. Consider Influenza- PNA vaccination. Declining Mammogram/ DEXA scan.      No results found for: CHOL  No results found for: TRIG  No results found for: HDL  No results found for: LDLCHOLESTEROL, LDLCALC  No results found for: LABVLDL, VLDL  No results found for: CHOLHDLRATIO    No results found for: NA, K, CL, CO2, BUN, CREATININE, GLUCOSE, CALCIUM, PROT, LABALBU, BILITOT, ALKPHOS, AST, ALT, LABGLOM, GFRAA, AGRATIO, GLOB      Review of Systems   Constitutional: Positive for appetite change (increased due to prednisone) and fatigue (improving since prednisone increase). Negative for activity change, chills, diaphoresis and fever. HENT: Negative for congestion, dental problem, drooling, ear discharge, ear pain, facial swelling, hearing loss, mouth sores, nosebleeds, postnasal drip, rhinorrhea, sinus pressure, sinus pain, sneezing, sore throat, tinnitus, trouble swallowing and voice change. Eyes: Positive for visual disturbance (wears glasses). Negative for photophobia, pain, discharge, redness and itching. Respiratory: Negative for apnea, cough, choking, chest tightness, shortness of breath, wheezing and stridor. Cardiovascular: Positive for leg swelling (due to prednisone). Negative for chest pain and palpitations. Gastrointestinal: Negative for abdominal distention, abdominal pain, anal bleeding, blood in stool, constipation, diarrhea, nausea, rectal pain and vomiting. Skin: Positive for rash. Negative for color change, pallor and wound. Neurological: Negative for dizziness, tremors, seizures, syncope, facial asymmetry, speech difficulty, weakness, light-headedness, numbness and headaches. Hematological: Negative for adenopathy. Does not bruise/bleed easily. Psychiatric/Behavioral: Negative for agitation, behavioral problems, confusion, decreased concentration, dysphoric mood, hallucinations, self-injury, sleep disturbance and suicidal ideas. The patient is not nervous/anxious and is not hyperactive.         Current Outpatient Medications on File Prior to Visit   Medication Sig Dispense Refill    predniSONE (DELTASONE) 5 MG tablet Take 5 mg by mouth daily      Omega-3 1000 MG CAPS Take 1 capsule by mouth daily  VITAMIN A PO Take 1 tablet by mouth daily 10,000 IU      vitamin E 90 MG (200 UNIT) CAPS capsule Take 200 Units by mouth daily      Ascorbic Acid (VITAMIN C CR) 1000 MG TBCR Take 1 tablet by mouth daily      vitamin B-1 (THIAMINE) 100 MG tablet Take 100 mg by mouth daily      Boswellia Sara (BOSWELLIA PO) Take by mouth      Glucosamine Sulfate 1000 MG CAPS Take by mouth      Cholecalciferol (VITAMIN D3) 1000 units TABS Take by mouth      loratadine (CLARITIN) 10 MG capsule Take by mouth       No current facility-administered medications on file prior to visit.      Allergies   Allergen Reactions    Asa [Aspirin] Rash    Penicillins Rash     Past Medical History:   Diagnosis Date    Arthritis     bilat knees    Dermatomyositis ([facility] [address]      Past Surgical History:   Procedure Laterality Date    COLONOSCOPY N/A 7/15/2019    COLON performed by Candice oRllins MD at [address]  7/15/2019    ESOPHAGEAL Beatriz Fray performed by Candice Rollins MD at [facility] Left     lateral release    SPINE SURGERY      lumbar 4 to 5    UPPER GASTROINTESTINAL ENDOSCOPY N/A 7/15/2019    EGD (10:30) performed by Candice Rollins MD at [facility]. History     Tobacco Use    Smoking status: Never Smoker    Smokeless tobacco: Never Used   Substance Use Topics    Alcohol use: No     Family History   Problem Relation Age of Onset    Other Mother         dieds in [de-identified] cancer;   Gopi Keller Other Father          at age 80    Other Sister         breast cancer;    Other Brother         ??    Other Brother         one  at 32 of psych ;one at age 68-parkinsons       Vitals:    21 1009 21 1012   BP: (!) 158/90 (!) 142/80   Pulse: 86    Temp: 97.6 °F (36.4 °C)    TempSrc: Infrared    SpO2: 98%    Weight: 242 lb (109.8 kg)    Height: 5' (1.524 m)      Estimated body mass index is 47.26 kg/m² as calculated from the following:    Height as of this encounter: 5' (1.524 m). Weight as of this encounter: 242 lb (109.8 kg). Physical Exam  Vitals and nursing note reviewed. Constitutional:       Appearance: Normal appearance. She is obese. HENT:      Head: Normocephalic. Neck:      Vascular: No carotid bruit. Cardiovascular:      Rate and Rhythm: Normal rate and regular rhythm. Pulses: Normal pulses. Carotid pulses are 2+ on the right side and 2+ on the left side. Dorsalis pedis pulses are 2+ on the right side and 2+ on the left side. Posterior tibial pulses are 2+ on the right side and 2+ on the left side. Heart sounds: Normal heart sounds. No murmur heard. No gallop. Pulmonary:      Effort: Pulmonary effort is normal.      Breath sounds: Normal breath sounds. Abdominal:      General: Abdomen is flat. Palpations: Abdomen is soft. Musculoskeletal:         General: Normal range of motion. Hands:       Cervical back: Normal range of motion. Right lower leg: No edema. Left lower leg: No edema. Comments: Finger erythema   Lymphadenopathy:      Cervical: No cervical adenopathy. Skin:     General: Skin is warm and dry. Capillary Refill: Capillary refill takes less than 2 seconds. Comments: Macular erythema on central abdomen   Neurological:      General: No focal deficit present. Mental Status: She is alert and oriented to person, place, and time. Psychiatric:         Mood and Affect: Mood normal.         Behavior: Behavior normal.           Patient's questions answered and concerns addressed. Patient agrees to plan of care.         Electronically signed by DAX Espinoza CNP on 11/8/2021 at 2:16 PM

## 2021-12-13 ENCOUNTER — IMMUNIZATION (OUTPATIENT)
Dept: FAMILY MEDICINE CLINIC | Age: 66
End: 2021-12-13
Payer: MEDICARE

## 2021-12-13 DIAGNOSIS — Z23 NEED FOR INFLUENZA VACCINATION: Primary | ICD-10-CM

## 2021-12-13 DIAGNOSIS — Z23 NEED FOR PNEUMOCOCCAL VACCINATION: ICD-10-CM

## 2021-12-13 PROCEDURE — G0009 ADMIN PNEUMOCOCCAL VACCINE: HCPCS

## 2021-12-13 PROCEDURE — 90694 VACC AIIV4 NO PRSRV 0.5ML IM: CPT

## 2021-12-13 PROCEDURE — 90732 PPSV23 VACC 2 YRS+ SUBQ/IM: CPT

## 2021-12-13 PROCEDURE — G0008 ADMIN INFLUENZA VIRUS VAC: HCPCS

## 2021-12-13 NOTE — PROGRESS NOTES
Vaccine Information Sheet, \"Influenza - Inactivated\"  given to Ed Hug, or parent/legal guardian of  Ed Hug and verbalized understanding. Patient responses:    Have you ever had a reaction to a flu vaccine? No  Do you have any current illness? No  Have you ever had Guillian [place] Syndrome? No  Do you have a serious allergy to any of the follow: Neomycin, Polymyxin, Thimerosal, eggs or egg products? No    Flu vaccine given per order. Please see immunization tab. Risks and benefits explained. Current VIS given.       Immunizations Administered     Name Date Dose Route    Influenza, Quadv, adjuvanted, 65 yrs +, IM, PF (Fluad) 12/13/2021 0.5 mL Intramuscular    Site: Deltoid- Left    Lot: [id number]    NDC: 72431-311-69    Pneumococcal Polysaccharide ([id number]) 12/13/2021 0.5 mL Intramuscular    Site: Deltoid- Right    Lot: [id number]    NDC: 0243-9774-13

## 2022-03-30 ENCOUNTER — TELEPHONE (OUTPATIENT)
Dept: FAMILY MEDICINE CLINIC | Age: 67
End: 2022-03-30

## 2022-03-30 DIAGNOSIS — G47.33 OBSTRUCTIVE SLEEP APNEA: Primary | ICD-10-CM

## 2022-03-30 NOTE — TELEPHONE ENCOUNTER
----- Message from Jayesh Khalil sent at 3/30/2022 10:15 AM EDT -----  Subject: Message to Provider    QUESTIONS  Information for Provider? Patient called to request an order for a Sleep   Study that is needed by Dr Lily Asher. Please contact patient to   discuss options regarding this matter. ---------------------------------------------------------------------------  --------------  Davidson SPIVEY  What is the best way for the office to contact you? OK to leave message on   voicemail  Preferred Call Back Phone Number? [phone]  ---------------------------------------------------------------------------  --------------  SCRIPT ANSWERS  Relationship to Patient?  Self

## 2022-03-30 NOTE — TELEPHONE ENCOUNTER
----- Message from Judah Denny sent at 3/30/2022 10:15 AM EDT -----  Subject: Message to Provider    QUESTIONS  Information for Provider? Patient called to request an order for a Sleep   Study that is needed by Dr Carlos Aguayo. Please contact patient to   discuss options regarding this matter. ---------------------------------------------------------------------------  --------------  Shira SPIVEY  What is the best way for the office to contact you? OK to leave message on   voicemail  Preferred Call Back Phone Number? [phone]  ---------------------------------------------------------------------------  --------------  SCRIPT ANSWERS  Relationship to Patient?  Self

## 2022-03-30 NOTE — TELEPHONE ENCOUNTER
Patient states that Dr. Joe Barron wanted you to order sleep study. Mentioned something about having an option of having a device she can wear at home to complete this. Please advise.

## 2022-03-30 NOTE — TELEPHONE ENCOUNTER
Called Pt she said Dr Macho La is recommending a sleep study   Due to some results that came back with her blood work

## 2022-06-01 ENCOUNTER — OFFICE VISIT (OUTPATIENT)
Dept: PULMONOLOGY | Age: 67
End: 2022-06-01
Payer: MEDICARE

## 2022-06-01 VITALS
BODY MASS INDEX: 47.32 KG/M2 | OXYGEN SATURATION: 98 % | SYSTOLIC BLOOD PRESSURE: 147 MMHG | DIASTOLIC BLOOD PRESSURE: 80 MMHG | WEIGHT: 241 LBS | HEIGHT: 60 IN | HEART RATE: 97 BPM

## 2022-06-01 DIAGNOSIS — G47.19 EXCESSIVE DAYTIME SLEEPINESS: Primary | ICD-10-CM

## 2022-06-01 DIAGNOSIS — R06.83 SNORING: ICD-10-CM

## 2022-06-01 PROCEDURE — 1090F PRES/ABSN URINE INCON ASSESS: CPT

## 2022-06-01 PROCEDURE — 3017F COLORECTAL CA SCREEN DOC REV: CPT

## 2022-06-01 PROCEDURE — G8427 DOCREV CUR MEDS BY ELIG CLIN: HCPCS

## 2022-06-01 PROCEDURE — 1036F TOBACCO NON-USER: CPT

## 2022-06-01 PROCEDURE — 1123F ACP DISCUSS/DSCN MKR DOCD: CPT

## 2022-06-01 PROCEDURE — G8400 PT W/DXA NO RESULTS DOC: HCPCS

## 2022-06-01 PROCEDURE — G8417 CALC BMI ABV UP PARAM F/U: HCPCS

## 2022-06-01 PROCEDURE — 99204 OFFICE O/P NEW MOD 45 MIN: CPT

## 2022-06-01 RX ORDER — METHOTREXATE 2.5 MG/1
2.5 TABLET ORAL WEEKLY
COMMUNITY
Start: 2022-03-28

## 2022-06-01 ASSESSMENT — SLEEP AND FATIGUE QUESTIONNAIRES
HOW LIKELY ARE YOU TO NOD OFF OR FALL ASLEEP WHILE WATCHING TV: 2
HOW LIKELY ARE YOU TO NOD OFF OR FALL ASLEEP WHILE LYING DOWN TO REST IN THE AFTERNOON WHEN CIRCUMSTANCES PERMIT: 3
HOW LIKELY ARE YOU TO NOD OFF OR FALL ASLEEP WHILE SITTING AND TALKING TO SOMEONE: 0
HOW LIKELY ARE YOU TO NOD OFF OR FALL ASLEEP IN A CAR, WHILE STOPPED FOR A FEW MINUTES IN TRAFFIC: 0
HOW LIKELY ARE YOU TO NOD OFF OR FALL ASLEEP WHILE SITTING INACTIVE IN A PUBLIC PLACE: 0
NECK CIRCUMFERENCE (INCHES): 15
HOW LIKELY ARE YOU TO NOD OFF OR FALL ASLEEP WHEN YOU ARE A PASSENGER IN A CAR FOR AN HOUR WITHOUT A BREAK: 0
HOW LIKELY ARE YOU TO NOD OFF OR FALL ASLEEP WHILE SITTING AND READING: 2
ESS TOTAL SCORE: 9
HOW LIKELY ARE YOU TO NOD OFF OR FALL ASLEEP WHILE SITTING QUIETLY AFTER LUNCH WITHOUT ALCOHOL: 2

## 2022-06-01 NOTE — PATIENT INSTRUCTIONS
Great to meet you and take care Ms. Osmel Daily! I hope we can get you feeling better.  -Samantha Baez      Never drive a car or operate a motorized vehicle while drowsy or sleepy. Sleep Hygiene. .. Important practices for better sleep:    · Avoid naps. This will ensure you are sleepy at bedtime. If you have to take a nap, sleep less than 1 hour, before 3 pm.  · SCHEDULE: Have a fixed bedtime and awakening time. The human body thrives on routines. Only deviate from these set sleep times about 1-2 hours on the weekends (more than this will start altering your internal clock). You will feel better keeping a regular sleep cycle and giving your body a dependable pattern, even (especially) if you are retired or not working. · Use light to train your biological clock: When you get up in the morning, exposure yourself to bright lights. When preparing for bed, dim all the lights and avoid exposure to screens. · Go to bed only when sleepy; this reduces the time you are awake in bed (which can lead to frustration and negative thoughts about sleep). If you can't fall asleep within 15-30 minutes, get up and do something boring until you feel sleepy again. Sit quietly in the dark or read the warranty on your refrigerator. Don't expose yourself to bright light during this time (especially screens), which would cue your brain that it is time to wake up. · Regular exercise is recommended to help you deepen your sleep (and MANY other reasons), but timing is important--aim to exercise in the morning or early afternoon, not within 4-6 hours of your bedtime. · Only use your bed for sleeping & intimacy. Do not use your bed as an office, workroom or recreation room. Let your mind/body \"know\" that the bed is associated with sleeping. · Develop sleep rituals. Give your body clues it is time to slow down and sleep. Dim the lights and turn off all screens!  Examples include; yoga, deep breathing, listen to relaxing music, a cup of caffeine-free hot tea, a hot bath or a few minutes of reading (in dim light). A hot bath ~90 mins before bed will raise your body temperature, but it is the drop in body temperature that can help you feel sleepy. · Avoid heavy, spicy or sugary foods 4-6 hours before bedtime   · Stay away from stimulants such as caffeine and nicotine for at least 4-6 hours before bed. Stimulants can interfere with your ability to fall asleep. Caffeine is found in tea, cola, coffee, cocoa and chocolate. Nicotine is found in tobacco products. · Avoid alcohol 4-6 hours before bedtime. Alcohol has an immediate sleep-inducing effect, but after a few hours when alcohol levels fall there is a stimulant or \"wake-up\" effect and will cause fragmented sleep. · Dont take worries to bed. Leave worries about life, work, school etc. behind you when you go to bed. Some people find it helpful to assign a worry period in the evening or late afternoon to talk or write down the worries and get them out of your system. · Ensure your bedroom is quiet and comfortable. A cooler room along with enough blankets to stay warm is recommended. If your room is too noisy, try a white noise machine. If too bright, try black out shades or an eye mask. Uncomfortable bedding can prevent good sleep. Evaluate whether or not this is a source of your problem, and make appropriate changes.

## 2022-06-01 NOTE — PROGRESS NOTES
sPULMONARY, CRITICAL CARE AND SLEEP MEDICINE   CC: Snoring  Referring Provider: Patient is being seen at the request of Dr. Marie Panda ([facility]) for a consultation to evaluate for Obstructive Sleep Apnea. Presenting HPI: 77 yo female with a 5+ year history of moderate to severe fatigue/ excessive daytime sleepiness associated with a few year history of mild snoring that improved after esophageal dilation in 2019. Her rheumatologist recommended she obtain a sleep study after finding secondary erythrocytosis on routine blood work while monitoring methotrexate therapy for dermatomyositis. No treatments tried so far. Has not been evaluated for sleep apnea in the past. Sleeps about 5.5 hrs per night; bedtime 11:30 pm and rise time 5 am. [place] is 9. No car wrecks or near wrecks because of the sleepiness. No nodding off while driving. Gained 20 pounds in the past 4 years. No history of atrial fibrillation. + history of HTN. Never smoker. + family history of ADRIENNE in her father. States she cannot do an in-lab PSG because she has to be home to get her day started at 5 am.       reports that she has never smoked.  She has never used smokeless tobacco.    Past Medical History:   Diagnosis Date    Arthritis     bilat knees    Dermatomyositis ([address]      Past Surgical History:   Procedure Laterality Date    COLONOSCOPY N/A 7/15/2019    COLON performed by Kyra Allison MD at [address]  7/15/2019    ESOPHAGEAL DILATION [place] performed by Kyra Allison MD at [address] Left     lateral release    SPINE SURGERY  2000    lumbar 4 to 5    UPPER GASTROINTESTINAL ENDOSCOPY N/A 7/15/2019    EGD (10:30) performed by [facility]ra [facility] at [facility]   Allergen Reactions    Asa [Aspirin] Rash    Other Rash     Allergic to Chlorox    Penicillins Rash     Medication list was reviewed and updated as needed in Epic.    family history includes Other in her brother, brother, father, mother, and sister. Review of Systems: Complete Review of system reviewed with patient and noted on attached review of system sheet. PHYSICAL EXAM:  Blood pressure (!) 150/80, pulse 97, height 5' (1.524 m), weight 241 lb (109.3 kg), SpO2 98 %, not currently breastfeeding.'   241# Jun 2022;   Constitutional:  No acute distress. HENT:  Oropharynx is clear and moist. No thyromegaly. Mallampati class IV airway. Eyes:  Conjunctivae are normal. Pupils equal, round, and reactive to light. No scleral icterus. Neck:  No tracheal deviation present. No obvious thyroid mass. Circumference is 15 inches. CV:  Normal rate, regular rhythm, normal heart sounds. ++ bilateral lower extremity edema. Pulm/Chest:  Clear breath sounds. No wheezes, rales or rhonchi. No accessory muscle usage or stridor. Moves air well. Abdominal:  Soft. No distension or obvious hernia. No tenderness or guarding. Musculoskeletal:  No cyanosis. No clubbing. No obvious joint deformity. Skin:  Skin is warm and dry. No rash or nodules on the exposed extremities. Psychiatric:  Blunt affect. Behavior is normal.   Neurological:  Alert, awake and oriented. No obvious cranial nerve deficits. Speech fluent. DATA:  Review of PCP records  Review of rheumatology records. NM Stress 6/3/2019  No reversible perfusion defect to suggest myocardial ischemia. Fixed perfusion defect in the  lateral  wall of the left ventricle, suggestive of myocardial infarct in the expected territory of the left circumflex  Dilated LV. LV EF 65%    CT Chest 7/9/2019 No significant abnormality    ASSESSMENT:   Excessive daytime sleepiness   Snoring   Comorbid conditions: Obesity, Dermatomyositis on Prednisone & MTX f/b Rheumatology   Never smoker    PLAN:    Sleep hygiene tips discussed and provided.    Specifically cautioned: absolutely no driving motorized vehicles or operating heavy machinery while fatigued, drowsy or sleepy.  Weight loss is also recommended as a long-term intervention.  Complications of ADRIENNE if not treated were discussed to include systemic hypertension, cardiovascular morbidities, car accidents and all cause mortality.     HST, evaluate for sleep-disordered breathing    F/U after sleep study; 31-90 days if receives machine

## 2022-08-25 ENCOUNTER — TELEPHONE (OUTPATIENT)
Dept: PULMONOLOGY | Age: 67
End: 2022-08-25

## 2022-08-25 NOTE — TELEPHONE ENCOUNTER
Patient cancelled appointment on 9/7/2022 with aquiles viramontes for 31-90. Reason: pt did not complete sleep study    Patient did not reschedule appointment. Appointment rescheduled for called pt with no answer, left message and gave them sleep center number informed pt to call us back to reschedule. Last OV 6/1/2022       ASSESSMENT:  Excessive daytime sleepiness  Snoring  Comorbid conditions: Obesity, Dermatomyositis on Prednisone & MTX f/b Rheumatology  Never smoker     PLAN:   Sleep hygiene tips discussed and provided. Specifically cautioned: absolutely no driving motorized vehicles or operating heavy machinery while fatigued, drowsy or sleepy. Weight loss is also recommended as a long-term intervention. Complications of ADRIENNE if not treated were discussed to include systemic hypertension, cardiovascular morbidities, car accidents and all cause mortality.    HST, evaluate for sleep-disordered breathing   F/U after sleep study; 31-90 days if receives machine

## 2022-08-31 NOTE — TELEPHONE ENCOUNTER
I notified the referring physician & her PCP. Pt is aware of the risks of untreated sleep apnea. I am happy to see her back any time. If she completes sleep study, then we will call her with results, recommendations and arrange follow up. OK to close encounter and await pt call back.

## 2024-01-30 ENCOUNTER — TELEPHONE (OUTPATIENT)
Dept: FAMILY MEDICINE CLINIC | Age: 69
End: 2024-01-30

## 2024-01-30 NOTE — TELEPHONE ENCOUNTER
Document  Updated appt   Carmela King APRN - CNP   Sent: Mon January 29, 2024  5:22 PM   To: [facility]              Message    I haven't seen this patient since 11/2021.  If she doesn't have an appt with us this year then she will be considered a new patient.  If she is being seen somewhere else for primary care that is fine.  But I did want us to at least offer a visit to keep her as a current patient if she desired to be.  Thanks.         ----- Message -----   From: Amarjit Burns MA   Sent: 1/29/2024  11:14 AM EST   To: DAX Zacarias CNP   Subject: Edit                                              The scan below was edited by Amarjit Burns MA on 01/29/2024 at 11:14; it is attached to the following: the 01/29/2024 Abstract with Amarjit Burns MA

## 2024-01-30 NOTE — TELEPHONE ENCOUNTER
Tried to call patient to see if she wants to make an appt since we have not seen her since 2021 see attached note from Carmela whiteside for patient to return call

## 2024-02-21 ENCOUNTER — OFFICE VISIT (OUTPATIENT)
Dept: FAMILY MEDICINE CLINIC | Age: 69
End: 2024-02-21
Payer: MEDICARE

## 2024-02-21 VITALS
SYSTOLIC BLOOD PRESSURE: 138 MMHG | WEIGHT: 250 LBS | OXYGEN SATURATION: 98 % | HEART RATE: 87 BPM | BODY MASS INDEX: 48.82 KG/M2 | DIASTOLIC BLOOD PRESSURE: 70 MMHG | TEMPERATURE: 97 F

## 2024-02-21 DIAGNOSIS — M17.12 PRIMARY OSTEOARTHRITIS OF LEFT KNEE: ICD-10-CM

## 2024-02-21 DIAGNOSIS — E66.01 MORBID OBESITY WITH BMI OF 40.0-44.9, ADULT (HCC): ICD-10-CM

## 2024-02-21 DIAGNOSIS — M33.90 DERMATOMYOSITIS (HCC): ICD-10-CM

## 2024-02-21 DIAGNOSIS — Z78.0 ENCOUNTER FOR OSTEOPOROSIS SCREENING IN ASYMPTOMATIC POSTMENOPAUSAL PATIENT: ICD-10-CM

## 2024-02-21 DIAGNOSIS — Z13.820 ENCOUNTER FOR OSTEOPOROSIS SCREENING IN ASYMPTOMATIC POSTMENOPAUSAL PATIENT: ICD-10-CM

## 2024-02-21 DIAGNOSIS — Z13.220 LIPID SCREENING: ICD-10-CM

## 2024-02-21 DIAGNOSIS — Z00.00 INITIAL MEDICARE ANNUAL WELLNESS VISIT: Primary | ICD-10-CM

## 2024-02-21 DIAGNOSIS — M79.10 MYALGIA: ICD-10-CM

## 2024-02-21 DIAGNOSIS — E55.9 VITAMIN D DEFICIENCY: ICD-10-CM

## 2024-02-21 DIAGNOSIS — R20.2 PARESTHESIA OF BOTH HANDS: ICD-10-CM

## 2024-02-21 PROCEDURE — G0438 PPPS, INITIAL VISIT: HCPCS | Performed by: NURSE PRACTITIONER

## 2024-02-21 PROCEDURE — 3017F COLORECTAL CA SCREEN DOC REV: CPT | Performed by: NURSE PRACTITIONER

## 2024-02-21 PROCEDURE — G8482 FLU IMMUNIZE ORDER/ADMIN: HCPCS | Performed by: NURSE PRACTITIONER

## 2024-02-21 PROCEDURE — 1123F ACP DISCUSS/DSCN MKR DOCD: CPT | Performed by: NURSE PRACTITIONER

## 2024-02-21 SDOH — ECONOMIC STABILITY: FOOD INSECURITY: WITHIN THE PAST 12 MONTHS, THE FOOD YOU BOUGHT JUST DIDN'T LAST AND YOU DIDN'T HAVE MONEY TO GET MORE.: NEVER TRUE

## 2024-02-21 SDOH — ECONOMIC STABILITY: INCOME INSECURITY: HOW HARD IS IT FOR YOU TO PAY FOR THE VERY BASICS LIKE FOOD, HOUSING, MEDICAL CARE, AND HEATING?: NOT HARD AT ALL

## 2024-02-21 SDOH — ECONOMIC STABILITY: HOUSING INSECURITY
IN THE LAST 12 MONTHS, WAS THERE A TIME WHEN YOU DID NOT HAVE A STEADY PLACE TO SLEEP OR SLEPT IN A SHELTER (INCLUDING NOW)?: NO

## 2024-02-21 SDOH — ECONOMIC STABILITY: FOOD INSECURITY: WITHIN THE PAST 12 MONTHS, YOU WORRIED THAT YOUR FOOD WOULD RUN OUT BEFORE YOU GOT MONEY TO BUY MORE.: NEVER TRUE

## 2024-02-21 ASSESSMENT — LIFESTYLE VARIABLES
HOW MANY STANDARD DRINKS CONTAINING ALCOHOL DO YOU HAVE ON A TYPICAL DAY: PATIENT DOES NOT DRINK
HOW OFTEN DO YOU HAVE A DRINK CONTAINING ALCOHOL: NEVER

## 2024-02-21 ASSESSMENT — PATIENT HEALTH QUESTIONNAIRE - PHQ9
SUM OF ALL RESPONSES TO PHQ QUESTIONS 1-9: 0
SUM OF ALL RESPONSES TO PHQ9 QUESTIONS 1 & 2: 0
SUM OF ALL RESPONSES TO PHQ QUESTIONS 1-9: 0
SUM OF ALL RESPONSES TO PHQ QUESTIONS 1-9: 0
1. LITTLE INTEREST OR PLEASURE IN DOING THINGS: 0
SUM OF ALL RESPONSES TO PHQ QUESTIONS 1-9: 0
2. FEELING DOWN, DEPRESSED OR HOPELESS: 0

## 2024-02-21 NOTE — PROGRESS NOTES
2/21/2024    Chief Complaint   Patient presents with    Medicare AWV     Not fasting     Muscle Pain     She told Dr Crow about it but he just avoided the question     Leg Pain     Says her legs ache and she has to rest          Argelia Burch is a 68 y.o. female, presents today for:      ASSESSMENT/PLAN:    1. Initial Medicare annual wellness visit  AWV completed today, see documentation below    2. Myalgia  Discussed possible w/u for myalgia  Discussed HEP  Discussed OTC meds  Vitamin D screenings ordered today  - Vitamin D 25 Hydroxy; Future    3. Primary osteoarthritis of left knee  See notes above    4. Paresthesia of both hands  See notes above  Consider EMG screening  - Vitamin D 25 Hydroxy; Future  - TSH with Reflex; Future  - Vitamin B12 & Folate; Future    5. Dermatomyositis (HCC)  Continue care with Rheumatology ([facility]), appreciate assistance  Currently weaning off Prednisone  Encouraged vaccinations- Influenza, RSV, Shingles  - Vitamin D 25 Hydroxy; Future  - TSH with Reflex; Future    6. Vitamin D deficiency  See notes above  - Vitamin D 25 Hydroxy; Future    7. Lipid screening  Screening labs ordered today  - LIPID PANEL; Future    8. Encounter for osteoporosis screening in asymptomatic postmenopausal patient  DEXA screening ordered today  - DEXA BONE DENSITY AXIAL SKELETON; Future  - Vitamin D 25 Hydroxy; Future  - TSH with Reflex; Future    9. Morbid obesity with BMI of 40.0-44.9, adult ([facility])  Encouraged diet/ exercise changes.     No follow-ups on file.    Presenting today for chronic disease follow up. Still working in a busy flower shop.      Sinus problems x 1 weeks.  Obtained cough syrup from [facility] with decongestant.  Using Benadryl but not helping.  Attempted Claritin but not helping PND.       Dermatomysitis: Currently following with Dr. Crow at [facility].  Currently on Methotrexate and Prednisone. Currently weaning off of the Prednisone    Patient continuing to declining CPAP.

## 2024-02-21 NOTE — PATIENT INSTRUCTIONS
SUPPLEMENT SHEET  [employer] Ultimate Omega with Lemon (FISH OIL) 1-2 capsules/day  Co-Enzyme Q10 - 100-200mg/day  Turmeric/Curcumin- 1-2 tablets/day  Magnesium- 400mg/day  Vitamin D3- 8660-3878 units/day  Glucosamine Chondroitin (Osteo Biflex)/MSM  CBD cream   Diclofenac Gel 1% (Voltaren Gel) - 2x per day- rub into area well for 60-90 seconds- wash hands well after each use     AVOID 90% OF THE TIME!!  DAIRY- Examples: Milk, Cheese, Ice cream   SUGAR- Examples: Cookies, Cakes, Pies, Frosting, Soda etc.       [facility]:   [phone]      Fatigue: Care Instructions  Overview     Fatigue is a feeling of tiredness, exhaustion, or lack of energy. You may feel fatigue because of too much or not enough activity. It can also come from stress, lack of sleep, boredom, and poor diet. Many medical problems, such as viral infections, can cause fatigue. Emotional problems, especially depression, are often the cause of fatigue.  Fatigue is most often a symptom of another problem. Treatment for fatigue depends on the cause. For example, if you have fatigue because you have a certain health problem, treating this problem also treats your fatigue. If depression or anxiety is the cause, treatment may help.  Follow-up care is a key part of your treatment and safety. Be sure to make and go to all appointments, and call your doctor if you are having problems. It's also a good idea to know your test results and keep a list of the medicines you take.  How can you care for yourself at home?  Get regular exercise. But try not to overdo it. It may help to go back and forth between rest and exercise.  Get plenty of rest.  Eat a variety of healthy foods. Try not to skip any meals.  Avoid or try to cut back on your use of caffeine, tobacco, and alcohol. Caffeine is most often found in coffee, tea, cola drinks, and energy drinks.  Limit medicines that can cause fatigue. These include medicines such as cold and allergy

## 2024-03-06 ENCOUNTER — TELEPHONE (OUTPATIENT)
Dept: FAMILY MEDICINE CLINIC | Age: 69
End: 2024-03-06

## 2024-05-29 LAB
CHOLESTEROL, TOTAL: 182 MG/DL
FOLATE: 11.9 NG/ML
HDLC SERPL-MCNC: 47 MG/DL
LDL CHOLESTEROL: 116 MG/DL
NONHDLC SERPL-MCNC: 135 MG/DL
TRIGL SERPL-MCNC: 106 MG/DL
TSH ULTRASENSITIVE: 4.02 MCIU/ML (ref 0.27–4.2)
VITAMIN B-12: 976 PG/ML (ref 180–914)
VITAMIN D 25-HYDROXY: 27.8 NG/ML (ref 30–150)

## 2024-05-30 NOTE — RESULT ENCOUNTER NOTE
Thyroid is normal.  Folate is normal.  Cholesterol is mildly elevated.  Can watch intake of fried, fatty, processed food and hopefully this will improve cholesterol.  Vitamin d slightly low, please increase your Vitamin D intake to 2000 IU daily.

## (undated) DEVICE — CONMED SCOPE SAVER BITE BLOCK, 20X27 MM: Brand: SCOPE SAVER

## (undated) DEVICE — ENDO CARRY-ON PROCEDURE KIT INCLUDES SUCTION TUBING, LUBRICANT, GAUZE, BIOHAZARD STICKER, TRANSPORT PAD AND INTERCEPT BEDSIDE KIT.: Brand: ENDO CARRY-ON PROCEDURE KIT

## (undated) DEVICE — ELECTRODE ECG MONITR FOAM TEAR DROP ADLT RED